# Patient Record
Sex: MALE | Race: ASIAN | URBAN - METROPOLITAN AREA
[De-identification: names, ages, dates, MRNs, and addresses within clinical notes are randomized per-mention and may not be internally consistent; named-entity substitution may affect disease eponyms.]

---

## 2018-10-04 ENCOUNTER — PRE VISIT (OUTPATIENT)
Dept: UROLOGY | Facility: CLINIC | Age: 23
End: 2018-10-04

## 2018-10-04 NOTE — TELEPHONE ENCOUNTER
MEDICAL RECORDS REQUEST   [facility]  Urology Clinic  [address] [place] [address]  PHONE: [phone]  Fax: [phone]        FUTURE VISIT INFORMATION                                                   Harlan Hagan, : 1995 scheduled for future visit at [facility]    APPOINTMENT INFORMATION:    Date: 10/15/2018    Provider:  Yevgeniy Vallecillo    Reason for Visit/Diagnosis: Buried Penis    REFERRAL INFORMATION:    Referring provider:  self    Specialty: [facility]    Referring providers clinic:  self    Clinic contact number:  self    RECORDS REQUESTED FOR VISIT                                                     NOTES  STATUS/DETAILS   OFFICE NOTE from referring provider  no   OFFICE NOTE from other specialist  no   DISCHARGE SUMMARY from hospital  no   DISCHARGE REPORT from the ER  no   OPERATIVE REPORT  no   MEDICATION LIST  no       PRE-VISIT CHECKLIST      Record collection complete Yes   Appointment appropriately scheduled           (right time/right provider) Yes   MyChart activation If no, please explain in process   Questionnaire complete If no, please explain in process     Completed by: Ashley Rowell

## 2018-10-08 ENCOUNTER — PRE VISIT (OUTPATIENT)
Dept: UROLOGY | Facility: CLINIC | Age: 23
End: 2018-10-08

## 2018-10-08 NOTE — TELEPHONE ENCOUNTER
Reason for visit: buried penis consult     Relevant information: pt is self referred    Records/imaging/labs: no records    Pt called: no need for a call    Rooming: regular

## 2018-10-15 ENCOUNTER — OFFICE VISIT (OUTPATIENT)
Dept: UROLOGY | Facility: CLINIC | Age: 23
End: 2018-10-15
Payer: COMMERCIAL

## 2018-10-15 VITALS
HEIGHT: 69 IN | DIASTOLIC BLOOD PRESSURE: 88 MMHG | BODY MASS INDEX: 28.68 KG/M2 | SYSTOLIC BLOOD PRESSURE: 134 MMHG | HEART RATE: 86 BPM | WEIGHT: 193.6 LBS

## 2018-10-15 DIAGNOSIS — N50.9 DISORDER OF MALE GENITAL ORGANS: Primary | ICD-10-CM

## 2018-10-15 ASSESSMENT — ENCOUNTER SYMPTOMS
POOR WOUND HEALING: 0
SKIN CHANGES: 0
NAIL CHANGES: 0

## 2018-10-15 ASSESSMENT — PAIN SCALES - GENERAL: PAINLEVEL: NO PAIN (0)

## 2018-10-15 NOTE — MR AVS SNAPSHOT
After Visit Summary   10/15/2018    Harlan Hagan    MRN: [id number]           Patient Information     Date Of Birth          1995        Visit Information        Provider Department      10/15/2018 8:00 AM Yevgeniy Vallecillo MD [facility]rology and [facility]        Today's Diagnoses     Disorder of male genital organs    -  1      Care Instructions    Follow up as needed      It was a pleasure meeting with you today.  Thank you for allowing me and my team the privilege of caring for you today.  YOU are the reason we are here, and I truly hope we provided you with the excellent service you deserve.  Please let us know if there is anything else we can do for you so that we can be sure you are leaving completely satisfied with your care experience.          Nicol Spivey MA          Follow-ups after your visit        Follow-up notes from your care team     Return if symptoms worsen or fail to improve.      Who to contact     Please call your clinic at [phone] to:    Ask questions about your health    Make or cancel appointments    Discuss your medicines    Learn about your test results    Speak to your doctor            Additional Information About Your Visit        MyChart Information     [employer] gives you secure access to your electronic health record. If you see a primary care provider, you can also send messages to your care team and make appointments. If you have questions, please call your primary care clinic.  If you do not have a primary care provider, please call [phone] and they will assist you.      [employer] is an electronic gateway that provides easy, online access to your medical records. With [employer], you can request a clinic appointment, read your test results, renew a prescription or communicate with your care team.     To access your existing account, please contact your [facility] or call  "[phone] for assistance.        Care EveryWhere ID     This is your Care EveryWhere ID. This could be used by other organizations to access your [facility] medical records  [id number]        Your Vitals Were     Pulse Height BMI (Body Mass Index)             86 1.753 m (5' 9\") 28.59 kg/m2          Blood Pressure from Last 3 Encounters:   10/15/18 134/88    Weight from Last 3 Encounters:   10/15/18 87.8 kg (193 lb 9.6 oz)              Today, you had the following     No orders found for display       Primary Care Provider Fax #    Physician No Ref-Primary [phone]       No address on file        Equal Access to Services     [facility]: Hadii lona Meyer, waajda lujhonnyadaha, qaybta kaalmada lucina, elizabeth rubin . So [facility] [phone].    ATENCIÓN: Si habla español, tiene a bazzi disposición servicios gratuitos de asistencia lingüística. Llame al [phone].    We comply with applicable federal civil rights laws and [place] laws. We do not discriminate on the basis of race, color, national origin, age, disability, sex, sexual orientation, or gender identity.            Thank you!     Thank you for choosing [facility] UROLOGY AND [facility] FOR PROSTATE AND UROLOGIC CANCERS  for your care. Our goal is always to provide you with excellent care. Hearing back from our patients is one way we can continue to improve our services. Please take a few minutes to complete the written survey that you may receive in the mail after your visit with us. Thank you!             Your Updated Medication List - Protect others around you: Learn how to safely use, store and throw away your medicines at www.disposemymeds.org.      Notice  As of 10/15/2018 10:34 AM    You have not been prescribed any medications.      "

## 2018-10-15 NOTE — PATIENT INSTRUCTIONS
Follow up as needed      It was a pleasure meeting with you today.  Thank you for allowing me and my team the privilege of caring for you today.  YOU are the reason we are here, and I truly hope we provided you with the excellent service you deserve.  Please let us know if there is anything else we can do for you so that we can be sure you are leaving completely satisfied with your care experience.          Nicol Spivey MA

## 2018-10-15 NOTE — LETTER
"10/15/2018       RE: Harlan Hagan  [address]  [facility] [address]     Dear Colleague,    Thank you for referring your patient, Harlan Hagan, to the [facility] UROLOGY AND INST FOR PROSTATE AND UROLOGIC CANCERS at [facility]. Please see a copy of my visit note below.      Name: Harlan Hagan    MRN: [id number]   YOB: 1995                 Chief Complaint:   \"Micropenis\"          History of Present Illness:   Mr. Harlan Hagan is a 23 year old male seen in consultation for micropenis. Patient has no specific complaints but presents for evaluation due to the size of his penis. He reports no problems urinating and is not sexually active due to cultural considerations. He does not retract his foreskin to clean his penis.         Past Medical History:     Past Medical History:   Diagnosis Date     NO ACTIVE PROBLEMS             Past Surgical History:     Past Surgical History:   Procedure Laterality Date     NO HISTORY OF SURGERY              Social History:     Social History   Substance Use Topics     Smoking status: Never Smoker     Smokeless tobacco: Never Used     Alcohol use Yes            Family History:   History reviewed. No pertinent family history.           Allergies:   No Known Allergies         Medications:     No current outpatient prescriptions on file.     No current facility-administered medications for this visit.              Review of Systems:    ROS: 14 point ROS neg other than the symptoms noted above in the HPI and PMH.          Physical Exam:   B/P: 134/88, T: Data Unavailable, P: 86, R: Data Unavailable  Estimated body mass index is 28.59 kg/(m^2) as calculated from the following:    Height as of this encounter: 1.753 m (5' 9\").    Weight as of this encounter: 87.8 kg (193 lb 9.6 oz).  General: age-appropriate appearing male in NAD. Normal body habitus.  HEENT: Head AT/NC, EOMI, CN Grossly " intact  Resp: no respiratory distress, lung sounds clear.  CV: heart rate regular, S1, S2.  Lymph: No cervical, supraclavicular or axillary lymphadenopathy  Back: bony spine is non-tender, flanks are nontender  Abdomen: mild obesity , soft, non-distended, non-tender. No organomegaly.  : penis normal without urethral discharge; bilateral testicles palpably normal  Rectal exam: deferred  LE: no edema.   Neuro: grossly intact  Motor: excellent strength throughout  Skin: clear of rashes or ecchymoses.        Labs:    All laboratory data reviewed with patient  Significant for n/a      Imaging:    All imaging reviewed with patient.  Significant for n/a      Outside records:    I spent 10 minutes reviewing outside records.           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 Plan is for reassurance and weight loss and follow up prn.     F/U: prn    Doyle Torres MD  October 15, 2018     =======================================================  As the attending surgeon I, Yevgeniy Vallecillo, interviewed and examined the patient. The plan was developed between me and the patient. My findings and plan are as stated by the resident.    Yevgeniy Vallecillo MD

## 2018-10-15 NOTE — PROGRESS NOTES
"  Name: Harlan Hagan    MRN: [id number]   YOB: 1995                 Chief Complaint:   \"Micropenis\"          History of Present Illness:   Mr. Harlan Hagan is a 23 year old male seen in consultation for micropenis. Patient has no specific complaints but presents for evaluation due to the size of his penis. He reports no problems urinating and is not sexually active due to cultural considerations. He does not retract his foreskin to clean his penis.         Past Medical History:     Past Medical History:   Diagnosis Date     NO ACTIVE PROBLEMS             Past Surgical History:     Past Surgical History:   Procedure Laterality Date     NO HISTORY OF SURGERY              Social History:     Social History   Substance Use Topics     Smoking status: Never Smoker     Smokeless tobacco: Never Used     Alcohol use Yes            Family History:   History reviewed. No pertinent family history.           Allergies:   No Known Allergies         Medications:     No current outpatient prescriptions on file.     No current facility-administered medications for this visit.              Review of Systems:    ROS: 14 point ROS neg other than the symptoms noted above in the HPI and PMH.          Physical Exam:   B/P: 134/88, T: Data Unavailable, P: 86, R: Data Unavailable  Estimated body mass index is 28.59 kg/(m^2) as calculated from the following:    Height as of this encounter: 1.753 m (5' 9\").    Weight as of this encounter: 87.8 kg (193 lb 9.6 oz).  General: age-appropriate appearing male in NAD. Normal body habitus.  HEENT: Head AT/NC, EOMI, CN Grossly intact  Resp: no respiratory distress, lung sounds clear.  CV: heart rate regular, S1, S2.  Lymph: No cervical, supraclavicular or axillary lymphadenopathy  Back: bony spine is non-tender, flanks are nontender  Abdomen: mild obesity , soft, non-distended, non-tender. No organomegaly.  : penis normal without urethral discharge; bilateral " testicles palpably normal  Rectal exam: deferred  LE: no edema.   Neuro: grossly intact  Motor: excellent strength throughout  Skin: clear of rashes or ecchymoses.        Labs:    All laboratory data reviewed with patient  Significant for n/a      Imaging:    All imaging reviewed with patient.  Significant for n/a      Outside records:    I spent 10 minutes reviewing outside records.           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 Plan is for reassurance and weight loss and follow up prn.     F/U: prn    Doyle Torres MD  October 15, 2018     =======================================================  As the attending surgeon I, Yevgeniy Vallecillo, interviewed and examined the patient. The plan was developed between me and the patient. My findings and plan are as stated by the resident.    Yevgeniy Vallecillo MD      Answers for HPI/ROS submitted by the patient on 10/15/2018   General Symptoms: No  Skin Symptoms: Yes  HENT Symptoms: No  EYE SYMPTOMS: No  HEART SYMPTOMS: No  LUNG SYMPTOMS: No  INTESTINAL SYMPTOMS: No  URINARY SYMPTOMS: No  REPRODUCTIVE SYMPTOMS: No  SKELETAL SYMPTOMS: No  BLOOD SYMPTOMS: No  NERVOUS SYSTEM SYMPTOMS: No  MENTAL HEALTH SYMPTOMS: No  Changes in hair: No  Changes in moles/birth marks: No  Itching: No  Rashes: No  Changes in nails: No  Acne: No  Change in facial hair: Yes  Warts: No  Non-healing sores: No  Scarring: No  Flaking of skin: No  Color changes of hands/feet in cold : No  Sun sensitivity: No  Skin thickening: No  PHQ-2 Score: 2

## 2020-03-11 ENCOUNTER — HEALTH MAINTENANCE LETTER (OUTPATIENT)
Age: 25
End: 2020-03-11

## 2021-01-03 ENCOUNTER — HEALTH MAINTENANCE LETTER (OUTPATIENT)
Age: 26
End: 2021-01-03

## 2021-04-25 ENCOUNTER — HEALTH MAINTENANCE LETTER (OUTPATIENT)
Age: 26
End: 2021-04-25

## 2021-10-10 ENCOUNTER — HEALTH MAINTENANCE LETTER (OUTPATIENT)
Age: 26
End: 2021-10-10

## 2022-05-21 ENCOUNTER — HEALTH MAINTENANCE LETTER (OUTPATIENT)
Age: 27
End: 2022-05-21

## 2022-09-18 ENCOUNTER — HEALTH MAINTENANCE LETTER (OUTPATIENT)
Age: 27
End: 2022-09-18

## 2023-06-04 ENCOUNTER — HEALTH MAINTENANCE LETTER (OUTPATIENT)
Age: 28
End: 2023-06-04